# Patient Record
Sex: FEMALE | Race: WHITE | NOT HISPANIC OR LATINO | Employment: FULL TIME | URBAN - METROPOLITAN AREA
[De-identification: names, ages, dates, MRNs, and addresses within clinical notes are randomized per-mention and may not be internally consistent; named-entity substitution may affect disease eponyms.]

---

## 2017-11-27 ENCOUNTER — OFFICE VISIT (OUTPATIENT)
Dept: OBSTETRICS AND GYNECOLOGY | Age: 47
End: 2017-11-27

## 2017-11-27 VITALS — WEIGHT: 151 LBS | BODY MASS INDEX: 25.78 KG/M2 | HEIGHT: 64 IN

## 2017-11-27 DIAGNOSIS — Z01.419 ENCOUNTER FOR GYNECOLOGICAL EXAMINATION WITHOUT ABNORMAL FINDING: Primary | ICD-10-CM

## 2017-11-27 DIAGNOSIS — F41.0 PANIC ATTACKS: ICD-10-CM

## 2017-11-27 DIAGNOSIS — F41.9 ANXIETY: ICD-10-CM

## 2017-11-27 DIAGNOSIS — Z12.4 SCREENING FOR CERVICAL CANCER: ICD-10-CM

## 2017-11-27 PROCEDURE — 99396 PREV VISIT EST AGE 40-64: CPT | Performed by: OBSTETRICS & GYNECOLOGY

## 2017-11-27 NOTE — PROGRESS NOTES
"Routine Annual Visit    2017    Patient: Tasneem Nicholas          MR#:[id number]      Chief Complaint   Patient presents with   • Annual Exam     PT HERE FOR ROUTINE AE AND MG. SHE IS DOING OK. DECLINED BP TODAY DUE TO \"THROWING HER INTO A PANIC ATTACK\". DEALING WITH ANXIETY. LAST PAP  (NEG AND NEG HPV).       History of Present Illness    47 y.o. female  who presents for annual exam.   Having a lot of stress with 17 yo adopted son- homeschooling him  Also he is bipolar  Menses regular but not closer together, no skips, not too heavy  No HF or NS  Gave pt menopausal HO  Noticing anxiety a lot worse and seeing psychiatrist soon  Pap and mammo today          Patient's last menstrual period was 2017 (exact date).  Obstetric History:  OB History      Para Term  AB Living    0 0 0 0 0 0    SAB TAB Ectopic Multiple Live Births    0 0 0 0 0         Menstrual History:     Patient's last menstrual period was 2017 (exact date).       Sexual History:       ________________________________________  There is no problem list on file for this patient.      Past Medical History:   Diagnosis Date   • Anxiety        No past surgical history on file.    History   Smoking Status   • Never Smoker   Smokeless Tobacco   • Not on file       has a current medication list which includes the following prescription(s): clonazepam.  ________________________________________    Current contraception: none  History of abnormal Pap smear: no  Family history of Breast cancer: no  Family history of uterine or ovarian cancer: no  Family History of colon cancer/colon polyps: no  History of abnormal mammogram: no      The following portions of the patient's history were reviewed and updated as appropriate: allergies, current medications, past family history, past medical history, past social history, past surgical history and problem list.    Review of Systems    Pertinent items are noted in HPI.     Objective " "  Physical Exam    Ht 64\" (162.6 cm)  Wt 151 lb (68.5 kg)  LMP 11/03/2017 (Exact Date)  BMI 25.92 kg/m2   BP Readings from Last 3 Encounters:   07/23/16 133/92      Wt Readings from Last 3 Encounters:   11/27/17 151 lb (68.5 kg)   07/23/16 145 lb (65.8 kg)      BMI: Estimated body mass index is 25.92 kg/(m^2) as calculated from the following:    Height as of this encounter: 64\" (162.6 cm).    Weight as of this encounter: 151 lb (68.5 kg).      General:   alert, appears stated age and cooperative   Abdomen: soft, non-tender, without masses or organomegaly   Breast: inspection negative, no nipple discharge or bleeding, no masses or nodularity palpable   Vulva: normal   Vagina: normal mucosa   Cervix: no cervical motion tenderness and no lesions   Uterus: normal size, mobile or non-tender   Adnexa: no mass, fullness, tenderness     Assessment:    1. Normal annual exam   Assessment     ICD-10-CM ICD-9-CM   1. Encounter for gynecological examination without abnormal finding Z01.419 V72.31   2. Anxiety F41.9 300.00   3. Panic attacks F41.0 300.01   4. Screening for cervical cancer Z12.4 V76.2     Plan:    Plan     [x]  Mammogram request made  [x]  PAP done  []  Labs:   []  GC/Chl/TV  []  DEXA scan   []  Referral for colonoscopy:       Tasneem was seen today for annual exam.    Diagnoses and all orders for this visit:    Encounter for gynecological examination without abnormal finding  -     IGP, Aptima HPV, Rfx 16 / 18,45 - ThinPrep Vial, Cervix    Anxiety  -     IGP, Aptima HPV, Rfx 16 / 18,45 - ThinPrep Vial, Cervix    Panic attacks  -     IGP, Aptima HPV, Rfx 16 / 18,45 - ThinPrep Vial, Cervix    Screening for cervical cancer  -     IGP, Aptima HPV, Rfx 16 / 18,45 - ThinPrep Vial, Cervix            Counseling:  --Nutrition: Stressed importance of moderation and caloric balance, stressed fresh fruit and vegetables  --Exercise: Stressed the importance of regular exercise. 3-5 times weekly   - Discussed screening " mammogram recommendations.   --Discussed benefits of screening colonoscopy- age 50 unless FH  --Discussed pap smear screening recommendations

## 2017-11-30 LAB
CYTOLOGIST CVX/VAG CYTO: NORMAL
CYTOLOGY CVX/VAG DOC THIN PREP: NORMAL
DX ICD CODE: NORMAL
HIV 1 & 2 AB SER-IMP: NORMAL
HPV I/H RISK 4 DNA CVX QL PROBE+SIG AMP: NEGATIVE
Lab: NORMAL
OTHER STN SPEC: NORMAL
PATH REPORT.FINAL DX SPEC: NORMAL
STAT OF ADQ CVX/VAG CYTO-IMP: NORMAL

## 2018-12-31 ENCOUNTER — OFFICE VISIT (OUTPATIENT)
Dept: URGENT CARE | Facility: CLINIC | Age: 48
End: 2018-12-31
Payer: COMMERCIAL

## 2018-12-31 VITALS
SYSTOLIC BLOOD PRESSURE: 157 MMHG | OXYGEN SATURATION: 98 % | DIASTOLIC BLOOD PRESSURE: 97 MMHG | WEIGHT: 145 LBS | TEMPERATURE: 98 F | HEART RATE: 101 BPM | RESPIRATION RATE: 18 BRPM | HEIGHT: 65 IN | BODY MASS INDEX: 24.16 KG/M2

## 2018-12-31 DIAGNOSIS — R30.0 DYSURIA: ICD-10-CM

## 2018-12-31 DIAGNOSIS — J02.9 SORE THROAT: Primary | ICD-10-CM

## 2018-12-31 DIAGNOSIS — J01.90 ACUTE NON-RECURRENT SINUSITIS, UNSPECIFIED LOCATION: ICD-10-CM

## 2018-12-31 DIAGNOSIS — J02.0 STREP PHARYNGITIS: ICD-10-CM

## 2018-12-31 LAB
BILIRUB UR QL STRIP: NEGATIVE
CTP QC/QA: YES
GLUCOSE UR QL STRIP: NEGATIVE
KETONES UR QL STRIP: NEGATIVE
LEUKOCYTE ESTERASE UR QL STRIP: NEGATIVE
PH, POC UA: 6 (ref 5–8)
POC BLOOD, URINE: NEGATIVE
POC NITRATES, URINE: NEGATIVE
PROT UR QL STRIP: NEGATIVE
S PYO RRNA THROAT QL PROBE: NEGATIVE
SP GR UR STRIP: 1.01 (ref 1–1.03)
UROBILINOGEN UR STRIP-ACNC: NEGATIVE (ref 0.1–1.1)

## 2018-12-31 PROCEDURE — 87880 STREP A ASSAY W/OPTIC: CPT | Mod: QW,S$GLB,, | Performed by: NURSE PRACTITIONER

## 2018-12-31 PROCEDURE — 81003 URINALYSIS AUTO W/O SCOPE: CPT | Mod: QW,S$GLB,, | Performed by: NURSE PRACTITIONER

## 2018-12-31 PROCEDURE — 99204 OFFICE O/P NEW MOD 45 MIN: CPT | Mod: 25,S$GLB,, | Performed by: NURSE PRACTITIONER

## 2018-12-31 RX ORDER — AZITHROMYCIN 250 MG/1
TABLET, FILM COATED ORAL
Refills: 0 | COMMUNITY
Start: 2018-12-05

## 2018-12-31 RX ORDER — PREDNISONE 20 MG/1
TABLET ORAL
Refills: 0 | COMMUNITY
Start: 2018-12-05

## 2018-12-31 RX ORDER — CLONAZEPAM 2 MG/1
2 TABLET ORAL
COMMUNITY

## 2018-12-31 RX ORDER — ESCITALOPRAM OXALATE 10 MG/1
TABLET ORAL
Refills: 3 | COMMUNITY
Start: 2018-11-02

## 2018-12-31 RX ORDER — FLUTICASONE PROPIONATE 50 MCG
2 SPRAY, SUSPENSION (ML) NASAL 2 TIMES DAILY
Qty: 1 BOTTLE | Refills: 0 | Status: SHIPPED | OUTPATIENT
Start: 2018-12-31

## 2018-12-31 RX ORDER — CLINDAMYCIN HYDROCHLORIDE 300 MG/1
300 CAPSULE ORAL EVERY 8 HOURS
Qty: 30 CAPSULE | Refills: 0 | Status: SHIPPED | OUTPATIENT
Start: 2018-12-31 | End: 2019-01-10

## 2018-12-31 RX ORDER — PHENAZOPYRIDINE HYDROCHLORIDE 100 MG/1
100 TABLET, FILM COATED ORAL 3 TIMES DAILY PRN
Qty: 6 TABLET | Refills: 0 | Status: SHIPPED | OUTPATIENT
Start: 2018-12-31

## 2018-12-31 RX ORDER — LORATADINE 10 MG/1
10 TABLET ORAL DAILY
Qty: 30 TABLET | Refills: 2 | Status: SHIPPED | OUTPATIENT
Start: 2018-12-31 | End: 2019-12-31

## 2018-12-31 RX ORDER — PROMETHAZINE HYDROCHLORIDE AND DEXTROMETHORPHAN HYDROBROMIDE 6.25; 15 MG/5ML; MG/5ML
5 SYRUP ORAL
Qty: 118 ML | Refills: 0 | Status: SHIPPED | OUTPATIENT
Start: 2018-12-31 | End: 2019-01-10

## 2018-12-31 RX ORDER — METHYLPREDNISOLONE 4 MG/1
4 TABLET ORAL DAILY
Qty: 21 TABLET | Refills: 0 | Status: SHIPPED | OUTPATIENT
Start: 2018-12-31 | End: 2019-01-21

## 2018-12-31 RX ORDER — BENZONATATE 200 MG/1
200 CAPSULE ORAL 3 TIMES DAILY PRN
Qty: 20 CAPSULE | Refills: 0 | Status: SHIPPED | OUTPATIENT
Start: 2018-12-31 | End: 2019-01-10

## 2018-12-31 NOTE — PATIENT INSTRUCTIONS
"  Dysuria     Painful urination (dysuria) is often caused by a problem in the urinary tract.   Dysuria is pain felt during urination. It is often described as a burning. Learn more about this problem and how it can be treated.  What causes dysuria?  Possible causes include:  · Infection with a bacteria or virus such as a urinary tract infection (UTI or a sexually transmitted infection (STI)  · Sensitivity or allergy to chemicals such as those found in lotions and other products  · Prostate or bladder problems  · Radiation therapy to the pelvic area  How is dysuria diagnosed?  Your healthcare provider will examine you. He or she will ask about your symptoms and health. After talking with you and doing a physical exam, your healthcare provider may know what is causing your dysuria. He or she will usually request  a sample of your urine. Tests of your urine, or a "urinalysis," are done. A urinalysis may include:  · Looking at the urine sample (visual exam)  · Checking for substances (chemical exam)  · Looking at a small amount under a microscope (microscopic exam)  Some parts of the urinalysis may be done in the provider's office and some in a lab. And, the urine sample may be checked for bacteria and yeast (urine culture). Your healthcare provider will tell you more about these tests if they are needed.  How is dysuria treated?  Treatment depends on the cause. If you have a bacterial infection, you may need antibiotics. You may be given medicines to make it easier for you to urinate and help relieve pain. Your healthcare provider can tell you more about your treatment options. Untreated, symptoms may get worse.  When to call your healthcare provider  Call the healthcare provider right away if you have any of the following:  · Fever of 100.4°F (38°C) or higher   · No improvement after three days of treatment  · Trouble urinating because of pain  · New or increased discharge from the vagina or penis  · Rash or joint " pain  · Increased back or abdominal pain  · Enlarged painful lymph nodes (lumps) in the groin   Date Last Reviewed: 1/1/2017  © 5631-7446 [employer]. [address] [place] [address], [place] [address]. All rights reserved. This information is not intended as a substitute for professional medical care. Always follow your healthcare professional's instructions.

## 2018-12-31 NOTE — PROGRESS NOTES
"Subjective:       Patient ID: Jamee Varghese is a 48 y.o. female.    Vitals:  height is 5' 5" (1.651 m) and weight is 65.8 kg (145 lb). Her temperature is 97.5 °F (36.4 °C). Her blood pressure is 157/97 (abnormal) and her pulse is 101. Her respiration is 18 and oxygen saturation is 98%.     Chief Complaint: Sore Throat and Urinary Frequency    Pt from [place], here on vacation, drove here, 2 weeks of bronchitis with steroids and abx's-got much better but last few days began having a sore throat after arriving here-hurts more on right side, also reports uti symptoms began this morning with urinary urgency/presssure. Sister had strep and she was exposed. Cough is still present but much better now. Pt feels fatigue tired and run down. Pt using nasocort and zyrtec.      Sore Throat    This is a new problem. The current episode started in the past 7 days. The problem has been gradually worsening. There has been no fever. Associated symptoms include coughing, a hoarse voice and swollen glands. Pertinent negatives include no congestion, ear pain, shortness of breath, stridor, trouble swallowing or vomiting. She has had exposure to strep.   Urinary Frequency    Associated symptoms include frequency and urgency. Pertinent negatives include no chills, hematuria, nausea, vomiting or rash.       Constitution: Positive for fatigue. Negative for appetite change, chills, sweating and fever.   HENT: Positive for sore throat. Negative for ear pain, congestion, sinus pain, sinus pressure, trouble swallowing and voice change.    Neck: Positive for painful lymph nodes.   Eyes: Negative for eye redness.   Respiratory: Positive for cough. Negative for chest tightness, sputum production, bloody sputum, COPD, shortness of breath, stridor, wheezing and asthma.    Gastrointestinal: Negative for nausea and vomiting.   Genitourinary: Positive for frequency, urgency and pelvic pain. Negative for hematuria.   Musculoskeletal: Negative for " muscle ache.   Skin: Negative for rash.   Allergic/Immunologic: Negative for seasonal allergies and asthma.   Hematologic/Lymphatic: Positive for swollen lymph nodes.       Objective:      Physical Exam   Constitutional: She is oriented to person, place, and time. She appears well-developed and well-nourished. She is cooperative.  Non-toxic appearance. She does not appear ill. No distress.   HENT:   Head: Normocephalic and atraumatic.   Right Ear: Hearing, tympanic membrane, external ear and ear canal normal.   Left Ear: Hearing, tympanic membrane, external ear and ear canal normal.   Nose: Mucosal edema and rhinorrhea present. No nasal deformity. No epistaxis. Right sinus exhibits no maxillary sinus tenderness and no frontal sinus tenderness. Left sinus exhibits no maxillary sinus tenderness and no frontal sinus tenderness.   Mouth/Throat: Uvula is midline and mucous membranes are normal. No trismus in the jaw. Normal dentition. No uvula swelling. Posterior oropharyngeal edema and posterior oropharyngeal erythema present. Tonsils are 1+ on the right. Tonsils are 1+ on the left.   Eyes: Conjunctivae and lids are normal. No scleral icterus.   Sclera clear bilat   Neck: Trachea normal, normal range of motion, full passive range of motion without pain and phonation normal. Neck supple.   Cardiovascular: Normal rate, regular rhythm, normal heart sounds, intact distal pulses and normal pulses.   Pulmonary/Chest: Effort normal and breath sounds normal. No respiratory distress.   Abdominal: Soft. Normal appearance and bowel sounds are normal. She exhibits no distension, no abdominal bruit, no pulsatile midline mass and no mass. There is no tenderness. There is no CVA tenderness.   Musculoskeletal: Normal range of motion. She exhibits no edema or deformity.   Lymphadenopathy:        Head (right side): Submandibular adenopathy present.        Head (left side): Submandibular adenopathy present.   Neurological: She is alert  and oriented to person, place, and time. She has normal strength. She exhibits normal muscle tone. Coordination normal.   Skin: Skin is warm, dry and intact. She is not diaphoretic. No pallor.   Psychiatric: She has a normal mood and affect. Her speech is normal and behavior is normal. Judgment and thought content normal. Cognition and memory are normal.   Nursing note and vitals reviewed.      Assessment:       1. Sore throat    2. Dysuria    3. Acute non-recurrent sinusitis, unspecified location    4. Strep pharyngitis        Plan:         Sore throat  -     POCT rapid strep A    Dysuria  -     POCT Urinalysis, Dipstick, Automated, W/O Scope    Acute non-recurrent sinusitis, unspecified location  -     loratadine (CLARITIN) 10 mg tablet; Take 1 tablet (10 mg total) by mouth once daily.  Dispense: 30 tablet; Refill: 2  -     fluticasone (FLONASE) 50 mcg/actuation nasal spray; 2 sprays (100 mcg total) by Each Nare route 2 (two) times daily.  Dispense: 1 Bottle; Refill: 0    Strep pharyngitis    Other orders  -     phenazopyridine (PYRIDIUM) 100 MG tablet; Take 1 tablet (100 mg total) by mouth 3 (three) times daily as needed for Pain.  Dispense: 6 tablet; Refill: 0  -     clindamycin (CLEOCIN) 300 MG capsule; Take 1 capsule (300 mg total) by mouth every 8 (eight) hours. for 10 days  Dispense: 30 capsule; Refill: 0  -     promethazine-dextromethorphan (PROMETHAZINE-DM) 6.25-15 mg/5 mL Syrp; Take 5 mLs by mouth every 4 to 6 hours as needed.  Dispense: 118 mL; Refill: 0  -     benzonatate (TESSALON) 200 MG capsule; Take 1 capsule (200 mg total) by mouth 3 (three) times daily as needed for Cough.  Dispense: 20 capsule; Refill: 0  -     methylPREDNISolone (MEDROL) 4 MG Tab; Take 1 tablet (4 mg total) by mouth once daily. 24 mg (6 pills) PO on day 1, then decrease by 4mg/day x 5 days per dose pack instructions. for 21 doses  Dispense: 21 tablet; Refill: 0

## 2019-01-09 ENCOUNTER — PROCEDURE VISIT (OUTPATIENT)
Dept: OBSTETRICS AND GYNECOLOGY | Age: 49
End: 2019-01-09

## 2019-01-09 ENCOUNTER — OFFICE VISIT (OUTPATIENT)
Dept: OBSTETRICS AND GYNECOLOGY | Age: 49
End: 2019-01-09

## 2019-01-09 ENCOUNTER — APPOINTMENT (OUTPATIENT)
Dept: WOMENS IMAGING | Facility: HOSPITAL | Age: 49
End: 2019-01-09

## 2019-01-09 VITALS
DIASTOLIC BLOOD PRESSURE: 70 MMHG | HEIGHT: 65 IN | BODY MASS INDEX: 26.16 KG/M2 | SYSTOLIC BLOOD PRESSURE: 128 MMHG | WEIGHT: 157 LBS

## 2019-01-09 DIAGNOSIS — Z12.31 VISIT FOR SCREENING MAMMOGRAM: Primary | ICD-10-CM

## 2019-01-09 DIAGNOSIS — Z01.419 WELL WOMAN EXAM WITH ROUTINE GYNECOLOGICAL EXAM: Primary | ICD-10-CM

## 2019-01-09 DIAGNOSIS — N92.6 IRREGULAR MENSES: ICD-10-CM

## 2019-01-09 PROCEDURE — 99396 PREV VISIT EST AGE 40-64: CPT | Performed by: PHYSICIAN ASSISTANT

## 2019-01-09 PROCEDURE — 77067 SCR MAMMO BI INCL CAD: CPT | Performed by: OBSTETRICS & GYNECOLOGY

## 2019-01-09 PROCEDURE — 77067 SCR MAMMO BI INCL CAD: CPT | Performed by: RADIOLOGY

## 2019-01-09 PROCEDURE — 82274 ASSAY TEST FOR BLOOD FECAL: CPT | Performed by: PHYSICIAN ASSISTANT

## 2019-01-09 RX ORDER — ESCITALOPRAM OXALATE 10 MG/1
TABLET ORAL
Refills: 3 | COMMUNITY
Start: 2019-01-06 | End: 2019-09-08 | Stop reason: ALTCHOICE

## 2019-01-09 NOTE — PROGRESS NOTES
"Subjective     Chief Complaint   Patient presents with   • Gynecologic Exam     annual exam. last pap 2017 neg/neg last m/g today       History of Present Illness    Tasneem Nicholas is a 48 y.o.  who presents for annual exam.    She is here for annual  She was told that she is \"menopausal\"  She was seen at her PCP and they ran some labs on her that indicated this.    Was seen by Kamla Amato NP, IMM (integrative medicine)  She \"peed on a bunch of sticks\" and then reviewed her results with her  She was offered meds but didn't take them  She was noting mood changes that have improved with lexapro  She is not really interested in looking otherwise    She is recovering from bronchitis  Had it for 5 wks  Wants to get the flu shot but hasn't felt well enough to get it.    She is a pt of Dr oneill    Her menses are regular every 28-30 days, lasting 4-7 days, dysmenorrhea none   Obstetric History:  OB History      Para Term  AB Living    0 0 0 0 0 0    SAB TAB Ectopic Molar Multiple Live Births    0 0 0 0 0 0         Menstrual History:     No LMP recorded.         Current contraception: vasectomy  History of abnormal Pap smear: no  Received Gardasil immunization: no  Perform regular self breast exam: no  Family history of uterine or ovarian cancer: no  Family History of colon cancer: no  Family history of breast cancer: no    Mammogram: done today.  Colonoscopy: recommended.  DEXA: not indicated.    Exercise: moderately active  Calcium/Vitamin D: adequate intake    The following portions of the patient's history were reviewed and updated as appropriate: allergies, current medications, past family history, past medical history, past social history, past surgical history and problem list.    Review of Systems   Genitourinary: Positive for menstrual problem (shorter menstrual cycle).   All other systems reviewed and are negative.      Review of Systems   Constitutional: Negative for fatigue. " "  Respiratory: Negative for shortness of breath.    Gastrointestinal: Negative for abdominal pain.   Genitourinary: Negative for dysuria.   Neurological: Negative for headaches.   Psychiatric/Behavioral: Negative for dysphoric mood.         Objective   Physical Exam    /70   Ht 163.8 cm (64.5\")   Wt 71.2 kg (157 lb)   Breastfeeding? No   BMI 26.53 kg/m²     General:   alert, appears stated age and cooperative   Neck: no adenopathy and thyroid normal to palpation   Heart: regular rate and rhythm   Lungs: clear to auscultation bilaterally   Abdomen: soft and nontender   Breast: inspection negative, no nipple discharge or bleeding, no masses or nodularity palpable   Vulva: normal   Vagina: normal mucosa, normal discharge   Cervix: no lesions   Uterus: normal size, non-tender   Adnexa: normal adnexa and no mass, fullness, tenderness   Rectal: normal rectal, no masses and guaiac negative stool obtained     Assessment/Plan   Tasneem was seen today for gynecologic exam.    Diagnoses and all orders for this visit:    Well woman exam with routine gynecological exam    Irregular menses        All questions answered.  Breast self exam technique reviewed and patient encouraged to perform self-exam monthly.  Discussed healthy lifestyle modifications.  Recommended 30 minutes of aerobic exercise five times per week.  Discussed calcium needs to prevent osteoporosis.      Disc pap guidelines she is utd  Mammogram done today  Disc u/s to further eval irregular bleeding  Enc [facility], she will arrange           "

## 2019-02-27 ENCOUNTER — TELEPHONE (OUTPATIENT)
Dept: OBSTETRICS AND GYNECOLOGY | Age: 49
End: 2019-02-27

## 2019-03-05 ENCOUNTER — PROCEDURE VISIT (OUTPATIENT)
Dept: OBSTETRICS AND GYNECOLOGY | Age: 49
End: 2019-03-05

## 2019-03-05 ENCOUNTER — OFFICE VISIT (OUTPATIENT)
Dept: OBSTETRICS AND GYNECOLOGY | Age: 49
End: 2019-03-05

## 2019-03-05 VITALS
BODY MASS INDEX: 25.99 KG/M2 | HEIGHT: 65 IN | DIASTOLIC BLOOD PRESSURE: 90 MMHG | SYSTOLIC BLOOD PRESSURE: 150 MMHG | WEIGHT: 156 LBS

## 2019-03-05 DIAGNOSIS — D25.1 INTRAMURAL LEIOMYOMA OF UTERUS: ICD-10-CM

## 2019-03-05 DIAGNOSIS — R10.2 PELVIC PAIN: ICD-10-CM

## 2019-03-05 DIAGNOSIS — N92.6 IRREGULAR MENSES: Primary | ICD-10-CM

## 2019-03-05 DIAGNOSIS — R10.2 PELVIC PAIN IN FEMALE: Primary | ICD-10-CM

## 2019-03-05 PROCEDURE — 99213 OFFICE O/P EST LOW 20 MIN: CPT | Performed by: OBSTETRICS & GYNECOLOGY

## 2019-03-05 PROCEDURE — 76830 TRANSVAGINAL US NON-OB: CPT | Performed by: OBSTETRICS & GYNECOLOGY

## 2019-03-05 NOTE — PROGRESS NOTES
"GYN Visit    3/5/2019    Patient: Tasneem Nicholas          MR#:[id number]      Chief Complaint   Patient presents with   • Imaging Only     PT HERE FOR GYN U/S DUE TO PELVIC PAIN AND OVARIAN CYST. SHE IS WELL.       History of Present Illness    49 y.o. female  who presents for  Pelvic pain  Usually has ovulation pain and dysmenorrhea every month  Cycles now every 3 weeks  No HF or NS  No pregnancy   has vas  Adopted    This past cycle had the ovulation pain and more than usual and lasted 4-5 days  Today doing well, on day 2 menses, no pain today        Patient's last menstrual period was 2019.    ________________________________________  There is no problem list on file for this patient.      Past Medical History:   Diagnosis Date   • Anxiety        No past surgical history on file.    Social History     Tobacco Use   Smoking Status Never Smoker       has a current medication list which includes the following prescription(s): clonazepam, escitalopram, brompheniramine-pseudoephedrine-dm, and clindamycin.  ________________________________________    Current contraception: vasectomy      The following portions of the patient's history were reviewed and updated as appropriate: allergies, current medications, past family history, past medical history, past social history, past surgical history and problem list.    Review of Systems   Genitourinary: Positive for pelvic pain. Negative for dyspareunia and vaginal discharge.   Psychiatric/Behavioral: Negative for dysphoric mood.   All other systems reviewed and are negative.      Pertinent items are noted in HPI.     Objective   Physical Exam    /90   Ht 163.8 cm (64.5\")   Wt 70.8 kg (156 lb)   LMP 2019   BMI 26.36 kg/m²    BP Readings from Last 3 Encounters:   19 150/90   19 124/83   19 128/70      Wt Readings from Last 3 Encounters:   19 70.8 kg (156 lb)   19 68 kg (150 lb)   19 71.2 kg (157 lb)    " "  BMI: Estimated body mass index is 26.36 kg/m² as calculated from the following:    Height as of this encounter: 163.8 cm (64.5\").    Weight as of this encounter: 70.8 kg (156 lb).    Lungs: non labored breathing, no wheezing or tachpnea  Extremities: extremities normal, atraumatic, no cyanosis or edema  Skin: Skin color, texture, turgor normal. No rashes or lesions  Neurologic: Grossly normal  General:   alert, appears stated age and cooperative   Abdomen: soft, non-tender, without masses or organomegaly       Vulva: normal   Vagina: normal mucosa   Cervix: no cervical motion tenderness and no lesions   Uterus: normal size, mobile or non-tender   Adnexa: no mass, fullness, tenderness     Assessment:      Tasneem was seen today for imaging only.    Diagnoses and all orders for this visit:    Pelvic pain in female    Intramural leiomyoma of uterus      See US- small fibroid, lining thin 5 mm, ovaries are normal, no ovarian cyst  Cul de sac fluid non-specific, could be evidence of ruptured cyst resolving  Reassuring US    Expectant management, discussed possible future menopausal symptoms         "

## 2020-01-21 ENCOUNTER — TELEPHONE (OUTPATIENT)
Dept: OBSTETRICS AND GYNECOLOGY | Age: 50
End: 2020-01-21

## 2020-02-10 ENCOUNTER — OFFICE VISIT (OUTPATIENT)
Dept: OBSTETRICS AND GYNECOLOGY | Age: 50
End: 2020-02-10

## 2020-02-10 VITALS
DIASTOLIC BLOOD PRESSURE: 84 MMHG | HEIGHT: 65 IN | SYSTOLIC BLOOD PRESSURE: 140 MMHG | BODY MASS INDEX: 26.33 KG/M2 | WEIGHT: 158 LBS

## 2020-02-10 DIAGNOSIS — N94.6 DYSMENORRHEA: ICD-10-CM

## 2020-02-10 DIAGNOSIS — Z11.51 SCREENING FOR HPV (HUMAN PAPILLOMAVIRUS): ICD-10-CM

## 2020-02-10 DIAGNOSIS — R10.2 PELVIC PAIN: Primary | ICD-10-CM

## 2020-02-10 DIAGNOSIS — Z12.4 SCREENING FOR CERVICAL CANCER: ICD-10-CM

## 2020-02-10 PROCEDURE — 99213 OFFICE O/P EST LOW 20 MIN: CPT | Performed by: OBSTETRICS & GYNECOLOGY

## 2020-02-10 NOTE — PROGRESS NOTES
"GYN Visit    2/10/2020    Patient: Tasneem Nicholas          MR#:[id number]      Chief Complaint   Patient presents with   • Pelvic Pain     U/S with Tad. Pelvic pain. Cycles every three weeks.  Previously had U/S in 2019.       History of Present Illness    49 y.o. female  who presents for pelvic pain  Consistent from mid cycle to menses, gets a week break right after menses  Low level , daily and crampy  occ advil helps  Worked up for this in 2019 with normal US  No pain today  Bleeding with menses every 3 weeks, no HF or NS but has tested menopausal in past  Due for pap  Scheduled for US today but declined since she has no pain today         Patient's last menstrual period was 2020.    ________________________________________  Patient Active Problem List   Diagnosis   • UTI (urinary tract infection), uncomplicated       Past Medical History:   Diagnosis Date   • Anxiety        No past surgical history on file.    Social History     Tobacco Use   Smoking Status Never Smoker   Smokeless Tobacco Never Used       has a current medication list which includes the following prescription(s): clonazepam and doxycycline.  ________________________________________    Current contraception: none      The following portions of the patient's history were reviewed and updated as appropriate: allergies, current medications, past family history, past medical history, past social history, past surgical history and problem list.    Review of Systems   Constitutional: Negative for fatigue.   Gastrointestinal: Negative for abdominal distention.   Genitourinary: Positive for pelvic pain. Negative for dysuria and vaginal discharge.   Psychiatric/Behavioral: Negative for dysphoric mood.   All other systems reviewed and are negative.      Pertinent items are noted in HPI.     Objective   Physical Exam    /84   Ht 163.8 cm (64.5\")   Wt 71.7 kg (158 lb)   LMP 2020   Breastfeeding No   BMI 26.70 kg/m²  " "  BP Readings from Last 3 Encounters:   02/10/20 140/84   11/12/19 121/74   09/08/19 126/89      Wt Readings from Last 3 Encounters:   02/10/20 71.7 kg (158 lb)   11/12/19 68 kg (150 lb)   03/05/19 70.8 kg (156 lb)      BMI: Estimated body mass index is 26.7 kg/m² as calculated from the following:    Height as of this encounter: 163.8 cm (64.5\").    Weight as of this encounter: 71.7 kg (158 lb).    Lungs: non labored breathing, no wheezing or tachpnea  Extremities: extremities normal, atraumatic, no cyanosis or edema  Skin: Skin color, texture, turgor normal. No rashes or lesions  Neurologic: Grossly normal  General:   alert, appears stated age and cooperative   Abdomen: soft, non-tender, without masses or organomegaly       Vulva: normal   Vagina: normal mucosa   Cervix: no cervical motion tenderness and no lesions   Uterus: normal size, mobile or non-tender   Adnexa: no mass, fullness, tenderness     Assessment:      Tasneem was seen today for pelvic pain.    Diagnoses and all orders for this visit:    Pelvic pain    Dysmenorrhea    Screening for HPV (human papillomavirus)  -     IGP, Apt HPV,rfx 16 / 18,45    Screening for cervical cancer  -     IGP, Apt HPV,rfx 16 / 18,45      Pt declined US today  Pain clinically sounds like adenomyosis , endometriosis or dysmenorrhea,  Discussed low dose ocps, kyleena IUD, hysterectomy etc  Pt will think about it  Has AE and mammo scheduled in march        "

## 2020-02-12 LAB
CYTOLOGIST CVX/VAG CYTO: NORMAL
CYTOLOGY CVX/VAG DOC CYTO: NORMAL
CYTOLOGY CVX/VAG DOC THIN PREP: NORMAL
DX ICD CODE: NORMAL
HIV 1 & 2 AB SER-IMP: NORMAL
HPV I/H RISK 4 DNA CVX QL PROBE+SIG AMP: NEGATIVE
OTHER STN SPEC: NORMAL
STAT OF ADQ CVX/VAG CYTO-IMP: NORMAL

## 2020-04-28 ENCOUNTER — PROCEDURE VISIT (OUTPATIENT)
Dept: OBSTETRICS AND GYNECOLOGY | Age: 50
End: 2020-04-28

## 2020-04-28 ENCOUNTER — APPOINTMENT (OUTPATIENT)
Dept: WOMENS IMAGING | Facility: HOSPITAL | Age: 50
End: 2020-04-28

## 2020-04-28 DIAGNOSIS — Z12.31 VISIT FOR SCREENING MAMMOGRAM: Primary | ICD-10-CM

## 2020-04-28 PROCEDURE — 77067 SCR MAMMO BI INCL CAD: CPT | Performed by: RADIOLOGY

## 2020-04-28 PROCEDURE — 77067 SCR MAMMO BI INCL CAD: CPT | Performed by: OBSTETRICS & GYNECOLOGY

## 2021-03-30 ENCOUNTER — BULK ORDERING (OUTPATIENT)
Dept: CASE MANAGEMENT | Facility: OTHER | Age: 51
End: 2021-03-30

## 2021-03-30 DIAGNOSIS — Z23 IMMUNIZATION DUE: ICD-10-CM

## 2021-11-17 ENCOUNTER — OFFICE VISIT (OUTPATIENT)
Dept: OBSTETRICS AND GYNECOLOGY | Age: 51
End: 2021-11-17

## 2021-11-17 VITALS
DIASTOLIC BLOOD PRESSURE: 84 MMHG | SYSTOLIC BLOOD PRESSURE: 128 MMHG | HEIGHT: 65 IN | BODY MASS INDEX: 27.32 KG/M2 | WEIGHT: 164 LBS

## 2021-11-17 DIAGNOSIS — N92.6 IRREGULAR MENSES: Primary | ICD-10-CM

## 2021-11-17 DIAGNOSIS — R35.0 URINARY FREQUENCY: ICD-10-CM

## 2021-11-17 LAB
BILIRUB BLD-MCNC: NEGATIVE MG/DL
CLARITY, POC: CLEAR
COLOR UR: YELLOW
GLUCOSE UR STRIP-MCNC: NEGATIVE MG/DL
KETONES UR QL: NEGATIVE
LEUKOCYTE EST, POC: NEGATIVE
NITRITE UR-MCNC: NEGATIVE MG/ML
PH UR: 6.5 [PH] (ref 5–8)
PROT UR STRIP-MCNC: NEGATIVE MG/DL
RBC # UR STRIP: NEGATIVE /UL
SP GR UR: 1.02 (ref 1–1.03)
UROBILINOGEN UR QL: NORMAL

## 2021-11-17 PROCEDURE — 81002 URINALYSIS NONAUTO W/O SCOPE: CPT | Performed by: PHYSICIAN ASSISTANT

## 2021-11-17 PROCEDURE — 99213 OFFICE O/P EST LOW 20 MIN: CPT | Performed by: PHYSICIAN ASSISTANT

## 2021-11-17 RX ORDER — LISINOPRIL 10 MG/1
10 TABLET ORAL DAILY
COMMUNITY

## 2021-11-17 RX ORDER — ESCITALOPRAM OXALATE 5 MG/1
5 TABLET ORAL DAILY
COMMUNITY

## 2021-11-17 RX ORDER — MEDROXYPROGESTERONE ACETATE 10 MG/1
10 TABLET ORAL DAILY
Qty: 10 TABLET | Refills: 3 | Status: SHIPPED | OUTPATIENT
Start: 2021-11-17 | End: 2022-04-25

## 2021-11-17 RX ORDER — CETIRIZINE HYDROCHLORIDE 10 MG/1
10 TABLET ORAL DAILY
COMMUNITY

## 2021-11-17 NOTE — PROGRESS NOTES
"Subjective     Chief Complaint   Patient presents with   • Gynecologic Exam     c/o had gone 5 months with no period, then started spotting, then has been changing a pad a day for two weeks.  has stopped. now.  (c/o cramping), also c/o urine frequency       Tasneem Nicholas is a 51 y.o.  whose LMP is No LMP recorded. Patient is perimenopausal. presents with irregular menses    She has a h/o this  Has had an u/s done in   Lining was wnl  No biopsy done  tx options were offered at the time but pt declined    Did have labs done at her pcp's office that said she was MP  This was 2 ya  Nothing on file    D/w pt that MP is generally diagnosed when pt has been 1 year without a period    She is also noting 2 episode of urinary incontinence       No Additional Complaints Reported    The following portions of the patient's history were reviewed and updated as appropriate:vital signs, allergies, current medications, past family history, past medical history, past social history, past surgical history and problem list      Review of Systems   Genitourinary:positive for abnormal menstrual periods     Objective      /84   Ht 163.8 cm (64.5\")   Wt 74.4 kg (164 lb)   Breastfeeding No   BMI 27.72 kg/m²     Physical Exam    General:   alert, comfortable and no distress   Heart: Not performed today   Lungs: Not performed today.   Breast: Not performed today   Neck: na   Abdomen: {Not performed today   CVA: Not performed today   Pelvis: External genitalia: normal general appearance  Vaginal: normal mucosa without prolapse or lesions  Cervix: normal appearance and stenotic appearing   Extremities: Not performed today   Neurologic: negative   Psychiatric: Normal affect, judgement, and mood     Endometrial Biopsy    Date of procedure:  2021    Procedure documentation:    The cervix was grasped anterior at the 12 o'clock position.  The cavity sounded to 5 centimeters.  An endometrial biopsy specimen was " unable to be obtained d/t cervical stenosis. Dilation was attempted multiple times but unable to pass beyond 5 cm. No biopsy was obtained    Pt tolerated procedure ok              Lab Review   Labs: Urinalysis - with micro     Imaging   Ultrasound - Pelvic Vaginal    2 anterior fibroids noted  Endo thickness measures 14 mm  B ovaries wnl, folllcle noted on right    Assessment/Plan     ASSESSMENT  1. Irregular menses    2. Urinary frequency        PLAN  1.   Orders Placed This Encounter   Procedures   • Urine Culture - Urine, Urine, Clean Catch   • Follicle Stimulating Hormone   • POC Urinalysis Dipstick       2. Medications prescribed this encounter:        New Medications Ordered This Visit   Medications   • medroxyPROGESTERone (Provera) 10 MG tablet     Sig: Take 1 tablet by mouth Daily.     Dispense:  10 tablet     Refill:  3       3. Urine cultrue to be sent    4. Disc lining. Will plan 3 months of provera use, 10 mg for 10 days. Plan rpt u/s in 3 months with Dr Bernal. Can discuss poc at that time.       Follow up: GILBERT Shannon  11/17/2021

## 2021-11-18 ENCOUNTER — TELEPHONE (OUTPATIENT)
Dept: OBSTETRICS AND GYNECOLOGY | Age: 51
End: 2021-11-18

## 2021-11-18 LAB — FSH SERPL-ACNC: 8.5 MIU/ML

## 2021-11-18 NOTE — TELEPHONE ENCOUNTER
----- Message from GILBERT Cain sent at 11/18/2021  9:01 AM EST -----  Let Tasneem know her levels are not elevated, she is still in a jonna menopausal range.  She can use the provera that I prescribed and keep her f/u appt in 3 months. Hope she is doing ok and let me know if she has any questions

## 2021-11-19 LAB
BACTERIA UR CULT: NORMAL
BACTERIA UR CULT: NORMAL

## 2021-12-27 ENCOUNTER — TELEPHONE (OUTPATIENT)
Dept: OBSTETRICS AND GYNECOLOGY | Age: 51
End: 2021-12-27

## 2021-12-27 NOTE — TELEPHONE ENCOUNTER
Her period can start within 2 weeks of finishing progesterone.  She may still start bleeding within a week.  If her period never happens that is ok,  continue the progesterone 10 days each month and keep US appt.    The progesterone may have side effect of mood swings.  If she wants to consider another option make appt with me, if she is ok to continue with plan keep appt in February.

## 2021-12-27 NOTE — TELEPHONE ENCOUNTER
PT calls stating she finished her 10 day script for this month 3 days ago and has not started her cycle yet, she is having light cramping. Pt wanting to ask if a side effect of the Provera is leg cramping. PT c/o cramping in both legs thigh to calf onset yesterday 5pm. She is also having intense mood swings which she did see was a side effect of the medication. Pt wanting to let you know she was diagnosed with MS over thanksgiving she is unsure if this is related. Please advise

## 2022-02-22 ENCOUNTER — TELEPHONE (OUTPATIENT)
Dept: OBSTETRICS AND GYNECOLOGY | Age: 52
End: 2022-02-22

## 2022-02-22 NOTE — TELEPHONE ENCOUNTER
Spoke with pt  Had period right after last US  FSH at that time was NOT menopausal  Took provera one month with no period  Next month no period without provera  No period in 3 months, no HF or NS  Diagnosed with MS 3 months ago right after her appt with us    Reviewed US done yesterday- lining is thin and uniform at 5 mm  Reassuring  Pt has mammo scheduled in April  Pap due 4/23  Recommend schedule AE 4/23 with mammo   Will call for any other bleeding concerns

## 2022-04-01 ENCOUNTER — APPOINTMENT (OUTPATIENT)
Dept: WOMENS IMAGING | Facility: HOSPITAL | Age: 52
End: 2022-04-01

## 2022-04-01 ENCOUNTER — PROCEDURE VISIT (OUTPATIENT)
Dept: OBSTETRICS AND GYNECOLOGY | Age: 52
End: 2022-04-01

## 2022-04-01 DIAGNOSIS — Z12.31 VISIT FOR SCREENING MAMMOGRAM: Primary | ICD-10-CM

## 2022-04-01 PROCEDURE — 77067 SCR MAMMO BI INCL CAD: CPT | Performed by: OBSTETRICS & GYNECOLOGY

## 2022-04-01 PROCEDURE — 77063 BREAST TOMOSYNTHESIS BI: CPT | Performed by: OBSTETRICS & GYNECOLOGY

## 2022-04-01 PROCEDURE — 77063 BREAST TOMOSYNTHESIS BI: CPT | Performed by: RADIOLOGY

## 2022-04-01 PROCEDURE — 77067 SCR MAMMO BI INCL CAD: CPT | Performed by: RADIOLOGY

## 2022-04-11 DIAGNOSIS — R92.8 ABNORMAL MAMMOGRAM: Primary | ICD-10-CM

## 2022-04-12 ENCOUNTER — TELEPHONE (OUTPATIENT)
Dept: OBSTETRICS AND GYNECOLOGY | Age: 52
End: 2022-04-12

## 2022-04-12 NOTE — TELEPHONE ENCOUNTER
Pt calls asking to review her mammogram results, please advise if you will call patient to review at your next earliest convenience

## 2022-04-15 ENCOUNTER — APPOINTMENT (OUTPATIENT)
Dept: WOMENS IMAGING | Facility: HOSPITAL | Age: 52
End: 2022-04-15

## 2022-04-15 PROCEDURE — G0279 TOMOSYNTHESIS, MAMMO: HCPCS | Performed by: RADIOLOGY

## 2022-04-15 PROCEDURE — 77065 DX MAMMO INCL CAD UNI: CPT | Performed by: RADIOLOGY

## 2022-04-15 PROCEDURE — 77061 BREAST TOMOSYNTHESIS UNI: CPT | Performed by: RADIOLOGY

## 2022-04-19 DIAGNOSIS — R92.8 ABNORMAL MAMMOGRAM: Primary | ICD-10-CM

## 2022-04-25 ENCOUNTER — OFFICE VISIT (OUTPATIENT)
Dept: OBSTETRICS AND GYNECOLOGY | Age: 52
End: 2022-04-25

## 2022-04-25 VITALS
BODY MASS INDEX: 26.33 KG/M2 | DIASTOLIC BLOOD PRESSURE: 78 MMHG | WEIGHT: 158 LBS | SYSTOLIC BLOOD PRESSURE: 126 MMHG | HEIGHT: 65 IN

## 2022-04-25 DIAGNOSIS — Z01.419 ENCOUNTER FOR GYNECOLOGICAL EXAMINATION WITHOUT ABNORMAL FINDING: Primary | ICD-10-CM

## 2022-04-25 PROCEDURE — 99396 PREV VISIT EST AGE 40-64: CPT | Performed by: OBSTETRICS & GYNECOLOGY

## 2022-04-25 RX ORDER — GLATIRAMER 40 MG/ML
40 INJECTION, SOLUTION SUBCUTANEOUS 3 TIMES WEEKLY
COMMUNITY
Start: 2022-01-05

## 2022-04-25 RX ORDER — MEDROXYPROGESTERONE ACETATE 150 MG/ML
150 INJECTION, SUSPENSION INTRAMUSCULAR
COMMUNITY
End: 2022-04-25

## 2022-04-25 NOTE — PROGRESS NOTES
Routine Annual Visit    2022    Patient: Tasneem Nicholas          MR#:[id number]      Chief Complaint   Patient presents with   • Gynecologic Exam     Last Pap 2/10/20 (-), Last mammo 20, Next Mammo 2022, No concerns at this time.       History of Present Illness    52 y.o. female  who presents for annual exam.   Difficult year  Lost mother, dementia  Diagnosed with MS-on meds and symptoms gone ( urinary incontinence one of them)  Pap UTD  UTD cologuard, mammo done and will have follow up in 6 mo  No bleeding          No LMP recorded (exact date). Patient is perimenopausal.  Obstetric History:  OB History        0    Para   0    Term   0       0    AB   0    Living   0       SAB   0    IAB   0    Ectopic   0    Molar   0    Multiple   0    Live Births   0               Menstrual History:     No LMP recorded (exact date). Patient is perimenopausal.       Sexual History:       ________________________________________  Patient Active Problem List   Diagnosis   • UTI (urinary tract infection), uncomplicated       Past Medical History:   Diagnosis Date   • Anxiety    • MS (multiple sclerosis) ([facility]) 2021       History reviewed. No pertinent surgical history.    Social History     Tobacco Use   Smoking Status Never Smoker   Smokeless Tobacco Never Used       has a current medication list which includes the following prescription(s): cetirizine, clonazepam, escitalopram, glatiramer acetate, lisinopril, and probiotic product.  ________________________________________    Current contraception: post menopausal status  History of abnormal Pap smear: no  Family history of Breast cancer: no  Family history of uterine or ovarian cancer: no  Family History of colon cancer/colon polyps: no  History of abnormal mammogram: no      The following portions of the patient's history were reviewed and updated as appropriate: allergies, current medications, past family history, past medical history, past  "social history, past surgical history and problem list.    Review of Systems    Pertinent items are noted in HPI.     Objective   Physical Exam    /78   Ht 163.8 cm (64.5\")   Wt 71.7 kg (158 lb)   LMP  (Exact Date)   Breastfeeding No   BMI 26.70 kg/m²    BP Readings from Last 3 Encounters:   04/25/22 126/78   02/21/22 128/84   11/17/21 128/84      Wt Readings from Last 3 Encounters:   04/25/22 71.7 kg (158 lb)   02/21/22 74 kg (163 lb 3.2 oz)   11/17/21 74.4 kg (164 lb)      BMI: Estimated body mass index is 26.7 kg/m² as calculated from the following:    Height as of this encounter: 163.8 cm (64.5\").    Weight as of this encounter: 71.7 kg (158 lb).      General:   alert, appears stated age and cooperative   Abdomen: soft, non-tender, without masses or organomegaly   Breast: inspection negative, no nipple discharge or bleeding, no masses or nodularity palpable   Vulva: normal   Vagina: normal mucosa   Cervix: no cervical motion tenderness and no lesions   Uterus: normal size, mobile and non-tender   Adnexa: no mass, fullness, tenderness     Assessment:    1. Normal annual exam   Assessment     ICD-10-CM ICD-9-CM   1. Encounter for gynecological examination without abnormal finding  Z01.419 V72.31     Plan:    Plan     []  Mammogram request made  []  PAP done  []  Labs:   []  GC/Chl/TV  []  DEXA scan   []  Referral for colonoscopy:       Diagnoses and all orders for this visit:    1. Encounter for gynecological examination without abnormal finding (Primary)            Counseling:  --Nutrition: Stressed importance of moderation and caloric balance, stressed fresh fruit and vegetables  --Exercise: Stressed the importance of regular exercise. 3-5 times weekly   - Discussed screening mammogram recommendations.   --Discussed benefits of screening colonoscopy- age 45 unless FH  --Discussed pap smear screening recommendations  "

## 2022-10-17 ENCOUNTER — APPOINTMENT (OUTPATIENT)
Dept: WOMENS IMAGING | Facility: HOSPITAL | Age: 52
End: 2022-10-17

## 2022-10-17 PROCEDURE — 77065 DX MAMMO INCL CAD UNI: CPT | Performed by: RADIOLOGY

## 2022-10-17 PROCEDURE — G0279 TOMOSYNTHESIS, MAMMO: HCPCS | Performed by: RADIOLOGY

## 2022-10-17 PROCEDURE — 77061 BREAST TOMOSYNTHESIS UNI: CPT | Performed by: RADIOLOGY

## 2022-10-20 DIAGNOSIS — R92.1 BREAST CALCIFICATIONS: Primary | ICD-10-CM

## 2023-04-28 ENCOUNTER — APPOINTMENT (OUTPATIENT)
Dept: WOMENS IMAGING | Facility: HOSPITAL | Age: 53
End: 2023-04-28
Payer: COMMERCIAL

## 2023-04-28 PROCEDURE — 77066 DX MAMMO INCL CAD BI: CPT | Performed by: RADIOLOGY

## 2023-04-28 PROCEDURE — G0279 TOMOSYNTHESIS, MAMMO: HCPCS | Performed by: RADIOLOGY

## 2023-04-28 PROCEDURE — 77062 BREAST TOMOSYNTHESIS BI: CPT | Performed by: RADIOLOGY

## 2023-05-15 ENCOUNTER — OFFICE VISIT (OUTPATIENT)
Dept: OBSTETRICS AND GYNECOLOGY | Age: 53
End: 2023-05-15
Payer: COMMERCIAL

## 2023-05-15 VITALS
WEIGHT: 161.2 LBS | BODY MASS INDEX: 26.86 KG/M2 | SYSTOLIC BLOOD PRESSURE: 118 MMHG | HEIGHT: 65 IN | DIASTOLIC BLOOD PRESSURE: 78 MMHG

## 2023-05-15 DIAGNOSIS — Z11.51 SCREENING FOR HPV (HUMAN PAPILLOMAVIRUS): ICD-10-CM

## 2023-05-15 DIAGNOSIS — Z12.4 SCREENING FOR CERVICAL CANCER: ICD-10-CM

## 2023-05-15 DIAGNOSIS — Z01.419 ENCOUNTER FOR GYNECOLOGICAL EXAMINATION WITHOUT ABNORMAL FINDING: Primary | ICD-10-CM

## 2023-05-15 NOTE — PROGRESS NOTES
Routine Annual Visit    5/15/2023    Patient: Tasneem Nicholas          MR#:[id number]      Chief Complaint   Patient presents with   • Gynecologic Exam     Annual Exam - last pap 2/10/20 neg, mammo 23, Cologaurd in , Pt has no complaints today       History of Present Illness    53 y.o. female  who presents for annual exam.   Pap due  mammo UTD  cologuard neg 2 years ago  Off MS meds and doing wholistic diet and acupuncture and better!    Sporadic menses, seldom with HF or NS  8 mo since last menses          No LMP recorded (lmp unknown). Patient is perimenopausal.  Obstetric History:  OB History        0    Para   0    Term   0       0    AB   0    Living   0       SAB   0    IAB   0    Ectopic   0    Molar   0    Multiple   0    Live Births   0               Menstrual History:     No LMP recorded (lmp unknown). Patient is perimenopausal.       Sexual History:       ________________________________________  Patient Active Problem List   Diagnosis   • UTI (urinary tract infection), uncomplicated       Past Medical History:   Diagnosis Date   • Anxiety    • Hypertension    • Kidney stone     1kidnet stone and passed   • MS (multiple sclerosis) 2021   • PMS (premenstrual syndrome) Since 12   • Urinary tract infection    • Varicella Child       Past Surgical History:   Procedure Laterality Date   • WISDOM TOOTH EXTRACTION         Social History     Tobacco Use   Smoking Status Never   Smokeless Tobacco Never       has a current medication list which includes the following prescription(s): cetirizine, clonazepam, escitalopram, lisinopril, and probiotic product.  ________________________________________    Current contraception: post menopausal status  History of abnormal Pap smear: no  Family history of Breast cancer: no  Family history of uterine or ovarian cancer: no  Family History of colon cancer/colon polyps: no  History of abnormal mammogram: yes - follow ups  "benign      The following portions of the patient's history were reviewed and updated as appropriate: allergies, current medications, past family history, past medical history, past social history, past surgical history and problem list.    Review of Systems    Pertinent items are noted in HPI.     Objective   Physical Exam    /78   Ht 163.8 cm (64.5\")   Wt 73.1 kg (161 lb 3.2 oz)   LMP  (LMP Unknown)   BMI 27.24 kg/m²    BP Readings from Last 3 Encounters:   05/15/23 118/78   04/25/22 126/78   02/21/22 128/84      Wt Readings from Last 3 Encounters:   05/15/23 73.1 kg (161 lb 3.2 oz)   04/25/22 71.7 kg (158 lb)   02/21/22 74 kg (163 lb 3.2 oz)      BMI: Estimated body mass index is 27.24 kg/m² as calculated from the following:    Height as of this encounter: 163.8 cm (64.5\").    Weight as of this encounter: 73.1 kg (161 lb 3.2 oz).      General:   alert, appears stated age and cooperative   Abdomen: soft, non-tender, without masses or organomegaly   Breast: inspection negative, no nipple discharge or bleeding, no masses or nodularity palpable   Vulva: normal   Vagina: normal mucosa   Cervix: no cervical motion tenderness and no lesions   Uterus: normal size, mobile and non-tender   Adnexa: no mass, fullness, tenderness     Assessment:    1. Normal annual exam   Assessment     ICD-10-CM ICD-9-CM   1. Encounter for gynecological examination without abnormal finding  Z01.419 V72.31   2. Screening for cervical cancer  Z12.4 V76.2   3. Screening for HPV (human papillomavirus)  Z11.51 V73.81     Plan:    Plan     []  Mammogram request made  []  PAP done  []  Labs:   []  GC/Chl/TV  []  DEXA scan   []  Referral for colonoscopy:       Diagnoses and all orders for this visit:    1. Encounter for gynecological examination without abnormal finding (Primary)    2. Screening for cervical cancer  -     IGP, Apt HPV,rfx 16 / 18,45    3. Screening for HPV (human papillomavirus)  -     IGP, Apt HPV,rfx 16 / " 18,45            Counseling:  --Nutrition: Stressed importance of moderation and caloric balance, stressed fresh fruit and vegetables  --Exercise: Stressed the importance of regular exercise. 3-5 times weekly   - Discussed screening mammogram recommendations.   --Discussed benefits of screening colonoscopy- age 45 unless FH  --Discussed pap smear screening recommendations

## 2023-05-17 ENCOUNTER — TRANSCRIBE ORDERS (OUTPATIENT)
Dept: ADMINISTRATIVE | Facility: HOSPITAL | Age: 53
End: 2023-05-17
Payer: COMMERCIAL

## 2023-05-17 DIAGNOSIS — G35 MULTIPLE SCLEROSIS: Primary | ICD-10-CM

## 2023-06-12 ENCOUNTER — HOSPITAL ENCOUNTER (OUTPATIENT)
Dept: MRI IMAGING | Facility: HOSPITAL | Age: 53
Discharge: HOME | End: 2023-06-12
Admitting: REGISTERED NURSE
Payer: COMMERCIAL

## 2023-06-12 DIAGNOSIS — G35 MULTIPLE SCLEROSIS: ICD-10-CM

## 2023-06-12 PROCEDURE — A9577 INJ MULTIHANCE: HCPCS | Performed by: REGISTERED NURSE

## 2023-06-12 PROCEDURE — 82565 ASSAY OF CREATININE: CPT

## 2023-06-12 PROCEDURE — 0 GADOBENATE DIMEGLUMINE 529 MG/ML SOLUTION: Performed by: REGISTERED NURSE

## 2023-06-12 PROCEDURE — 70553 MRI BRAIN STEM W/O & W/DYE: CPT

## 2023-06-12 RX ADMIN — GADOBENATE DIMEGLUMINE 15 ML: 529 INJECTION, SOLUTION INTRAVENOUS at 11:25

## 2023-06-13 LAB — CREAT BLDA-MCNC: 0.8 MG/DL (ref 0.6–1.3)

## 2024-05-13 ENCOUNTER — APPOINTMENT (OUTPATIENT)
Dept: WOMENS IMAGING | Facility: HOSPITAL | Age: 54
End: 2024-05-13
Payer: COMMERCIAL

## 2024-05-13 PROCEDURE — 77062 BREAST TOMOSYNTHESIS BI: CPT | Performed by: RADIOLOGY

## 2024-05-13 PROCEDURE — G0279 TOMOSYNTHESIS, MAMMO: HCPCS | Performed by: RADIOLOGY

## 2024-05-13 PROCEDURE — 77066 DX MAMMO INCL CAD BI: CPT | Performed by: RADIOLOGY

## 2024-06-04 ENCOUNTER — OFFICE VISIT (OUTPATIENT)
Dept: OBSTETRICS AND GYNECOLOGY | Age: 54
End: 2024-06-04
Payer: COMMERCIAL

## 2024-06-04 VITALS
DIASTOLIC BLOOD PRESSURE: 68 MMHG | SYSTOLIC BLOOD PRESSURE: 110 MMHG | HEIGHT: 65 IN | BODY MASS INDEX: 27.32 KG/M2 | WEIGHT: 164 LBS

## 2024-06-04 DIAGNOSIS — Z01.419 ENCOUNTER FOR GYNECOLOGICAL EXAMINATION WITHOUT ABNORMAL FINDING: Primary | ICD-10-CM

## 2024-06-04 PROCEDURE — 99396 PREV VISIT EST AGE 40-64: CPT | Performed by: OBSTETRICS & GYNECOLOGY

## 2024-06-04 RX ORDER — NALTREXONE HCL 1.5 MG
CAPSULE ORAL
COMMUNITY
Start: 2024-01-01

## 2024-06-04 NOTE — PROGRESS NOTES
Routine Annual Visit    2024    Patient: Tasneem Nicholas          MR#:[id number]      Chief Complaint   Patient presents with    Gynecologic Exam     Annual Exam - last pap 5/15/23 neg, mammo 24, cologuard 1/15/22 neg, pt has no complaints today       History of Present Illness    54 y.o. female  who presents for annual exam.     Patient is feeling well  She has a history of MS and is treating this with a holistic treatment plan  She gets about 2 MRIs per year  She usually has about 2 relapses a year  Right now she feels great  No vaginal bleeding  No hot flashes or night sweats  She does work full-time  She is up-to-date on her Pap  Mammogram is up-to-date  Cologuard is up-to-date      No LMP recorded. Patient is perimenopausal.  Obstetric History:  OB History          0    Para   0    Term   0       0    AB   0    Living   0         SAB   0    IAB   0    Ectopic   0    Molar   0    Multiple   0    Live Births   0               Menstrual History:     No LMP recorded. Patient is perimenopausal.       Sexual History:       ________________________________________  Patient Active Problem List   Diagnosis    UTI (urinary tract infection), uncomplicated       Past Medical History:   Diagnosis Date    Anxiety     Hepatitis B     Tested pos for hep-b antibodies - dont know when I had it    Hypertension     Kidney stone 2019    1kidnet stone and passed    MS (multiple sclerosis) 2021    PMS (premenstrual syndrome) Since 12    Urinary tract infection 2000    Varicella Child       Past Surgical History:   Procedure Laterality Date    WISDOM TOOTH EXTRACTION         Social History     Tobacco Use   Smoking Status Never   Smokeless Tobacco Never       has a current medication list which includes the following prescription(s): clonazepam, escitalopram, naltrex, probiotic product, and vitamin d3.  ________________________________________    Current contraception: post menopausal  "status  History of abnormal Pap smear: no  Family history of Breast cancer: no  Family history of uterine or ovarian cancer: no  Family History of colon cancer/colon polyps: no  History of abnormal mammogram: no      The following portions of the patient's history were reviewed and updated as appropriate: allergies, current medications, past family history, past medical history, past social history, past surgical history, and problem list.    Review of Systems    Pertinent items are noted in HPI.     Objective   Physical Exam    /68   Ht 163.8 cm (64.5\")   Wt 74.4 kg (164 lb)   BMI 27.72 kg/m²    BP Readings from Last 3 Encounters:   06/04/24 110/68   05/15/23 118/78   04/25/22 126/78      Wt Readings from Last 3 Encounters:   06/04/24 74.4 kg (164 lb)   05/15/23 73.1 kg (161 lb 3.2 oz)   04/25/22 71.7 kg (158 lb)      BMI: Estimated body mass index is 27.72 kg/m² as calculated from the following:    Height as of this encounter: 163.8 cm (64.5\").    Weight as of this encounter: 74.4 kg (164 lb).      General:   alert, appears stated age, and cooperative   Abdomen: soft, non-tender, without masses or organomegaly   Breast: inspection negative, no nipple discharge or bleeding, no masses or nodularity palpable   Vulva: normal, Bartholin's, Urethra, Impact's normal   Vagina: normal mucosa   Cervix: no cervical motion tenderness and no lesions   Uterus: normal size, mobile, and non-tender   Adnexa: no mass, fullness, tenderness     Assessment:    1. Normal annual exam   Assessment     ICD-10-CM ICD-9-CM   1. Encounter for gynecological examination without abnormal finding  Z01.419 V72.31     Plan:    Plan     []  Mammogram request made  []  PAP done  []  Labs:   []  GC/Chl/TV  []  DEXA scan   []  Referral for colonoscopy:       Diagnoses and all orders for this visit:    1. Encounter for gynecological examination without abnormal finding (Primary)            Counseling:  --Nutrition: Stressed importance of " moderation and caloric balance, stressed fresh fruit and vegetables  --Exercise: Stressed the importance of regular exercise. 3-5 times weekly   - Discussed screening mammogram recommendations.   --Discussed benefits of screening colonoscopy- age 45 unless FH  --Discussed pap smear screening recommendations

## 2024-08-12 ENCOUNTER — TELEPHONE (OUTPATIENT)
Dept: NEUROLOGY | Facility: CLINIC | Age: 54
End: 2024-08-12
Payer: COMMERCIAL

## 2024-08-12 NOTE — TELEPHONE ENCOUNTER
LVM informing them that we are r/s appt due to provider being out of office. Had sent a   mail reminder and Allen Tourst Message (if applicable) on new scheduled appointment date for  Sept 19th at 10:00AM

## 2024-09-19 ENCOUNTER — OFFICE VISIT (OUTPATIENT)
Dept: NEUROLOGY | Facility: CLINIC | Age: 54
End: 2024-09-19
Payer: COMMERCIAL

## 2024-09-19 VITALS
BODY MASS INDEX: 27.66 KG/M2 | DIASTOLIC BLOOD PRESSURE: 86 MMHG | WEIGHT: 162 LBS | OXYGEN SATURATION: 97 % | HEART RATE: 90 BPM | SYSTOLIC BLOOD PRESSURE: 134 MMHG | RESPIRATION RATE: 20 BRPM | HEIGHT: 64 IN

## 2024-09-19 DIAGNOSIS — G35 MULTIPLE SCLEROSIS: Primary | ICD-10-CM

## 2024-09-19 RX ORDER — NALTREXONE HCL 4.5 MG
3.5 CAPSULE ORAL DAILY
COMMUNITY
Start: 2024-01-01

## 2024-09-19 RX ORDER — ESCITALOPRAM OXALATE 10 MG
TABLET ORAL
COMMUNITY
Start: 2018-01-12

## 2024-09-20 ENCOUNTER — PATIENT ROUNDING (BHMG ONLY) (OUTPATIENT)
Dept: NEUROLOGY | Facility: CLINIC | Age: 54
End: 2024-09-20
Payer: COMMERCIAL

## 2024-10-22 ENCOUNTER — HOSPITAL ENCOUNTER (OUTPATIENT)
Dept: MRI IMAGING | Facility: HOSPITAL | Age: 54
Discharge: HOME | End: 2024-10-22
Admitting: PSYCHIATRY & NEUROLOGY
Payer: COMMERCIAL

## 2024-10-22 DIAGNOSIS — G35 MULTIPLE SCLEROSIS: ICD-10-CM

## 2024-10-22 PROCEDURE — A9577 INJ MULTIHANCE: HCPCS | Performed by: PSYCHIATRY & NEUROLOGY

## 2024-10-22 PROCEDURE — 70553 MRI BRAIN STEM W/O & W/DYE: CPT

## 2024-10-22 PROCEDURE — 0 GADOBENATE DIMEGLUMINE 529 MG/ML SOLUTION: Performed by: PSYCHIATRY & NEUROLOGY

## 2024-10-22 RX ADMIN — GADOBENATE DIMEGLUMINE 15 ML: 529 INJECTION, SOLUTION INTRAVENOUS at 14:20

## 2024-12-10 ENCOUNTER — TELEPHONE (OUTPATIENT)
Dept: NEUROLOGY | Facility: CLINIC | Age: 54
End: 2024-12-10
Payer: COMMERCIAL

## 2024-12-10 NOTE — TELEPHONE ENCOUNTER
Caller: Tasneem Nicholas    Relationship: Self    Best call back number: [phone]    What was the call regarding: PT STATES SHE FELL A LITTLE OVER A MONTH AGO. SHE STATES SHE FELL OUT OF THE BLUE WITH NO DIZZINESS, PRE-SYNCOPE, OR LIGHTHEADEDNESS BEFORE SHE FELL. PT DID NOT REPORT TO THE ED FOLLOWING THE FALL.    HER ORTHOPEDIST THOUGHT SHE HAD TORN HER ROTATOR CUFF. SHE HAD AN MRI R SHOULDER LAST WEEK. TORN ROTATOR CUFF WAS RULED OUT; PT DIAGNOSED WITH FROZEN SHOULDER DUE TO SIGNIFICANT INFLAMMATION. PT ASKS IF DR. COBURN HAS ANY THOUGHTS EGARDING THE UNPROMPTED FALL OR SHOULDER CONCERN.    Do you require a callback: YES, PLEASE.    PLEASE REVIEW AND ADVISE.

## 2025-01-13 ENCOUNTER — TELEPHONE (OUTPATIENT)
Dept: NEUROLOGY | Facility: CLINIC | Age: 55
End: 2025-01-13
Payer: COMMERCIAL

## 2025-01-13 NOTE — TELEPHONE ENCOUNTER
Pt request for form completion not completed.  I called pt.  She will come back in tomorrow, 1/15/2025, to sign form.

## 2025-01-13 NOTE — TELEPHONE ENCOUNTER
Spoke to pt in regards to r/s appt due to provider being out of office. Agreed to be r/s for later that week and is aware of updates on [employer].

## 2025-01-13 NOTE — TELEPHONE ENCOUNTER
PT of Dr. Stringer dropped off [facility] paperwork to be filled out by 1-17-25. Would like paperwork faxed. PT was informed of [facility] paperwork fee.      Thank you

## 2025-01-15 ENCOUNTER — HOSPITAL ENCOUNTER (OUTPATIENT)
Dept: MRI IMAGING | Facility: HOSPITAL | Age: 55
Discharge: HOME | End: 2025-01-15
Admitting: PSYCHIATRY & NEUROLOGY
Payer: COMMERCIAL

## 2025-01-15 DIAGNOSIS — G35 MULTIPLE SCLEROSIS: ICD-10-CM

## 2025-01-15 PROCEDURE — 25510000002 GADOBENATE DIMEGLUMINE 529 MG/ML SOLUTION: Performed by: PSYCHIATRY & NEUROLOGY

## 2025-01-15 PROCEDURE — 72156 MRI NECK SPINE W/O & W/DYE: CPT

## 2025-01-15 PROCEDURE — A9577 INJ MULTIHANCE: HCPCS | Performed by: PSYCHIATRY & NEUROLOGY

## 2025-01-15 RX ADMIN — GADOBENATE DIMEGLUMINE 14 ML: 529 INJECTION, SOLUTION INTRAVENOUS at 17:14

## 2025-01-23 ENCOUNTER — TRANSCRIBE ORDERS (OUTPATIENT)
Dept: ADMINISTRATIVE | Facility: HOSPITAL | Age: 55
End: 2025-01-23
Payer: COMMERCIAL

## 2025-01-23 ENCOUNTER — OFFICE VISIT (OUTPATIENT)
Dept: NEUROLOGY | Facility: CLINIC | Age: 55
End: 2025-01-23
Payer: COMMERCIAL

## 2025-01-23 VITALS
OXYGEN SATURATION: 97 % | HEART RATE: 85 BPM | SYSTOLIC BLOOD PRESSURE: 100 MMHG | DIASTOLIC BLOOD PRESSURE: 74 MMHG | HEIGHT: 64 IN | BODY MASS INDEX: 27.49 KG/M2 | RESPIRATION RATE: 20 BRPM | WEIGHT: 161 LBS

## 2025-01-23 DIAGNOSIS — Z13.6 SCREENING FOR HEART DISEASE: Primary | ICD-10-CM

## 2025-01-23 DIAGNOSIS — G35 MULTIPLE SCLEROSIS: Primary | ICD-10-CM

## 2025-01-23 PROBLEM — F41.9 ANXIETY: Status: ACTIVE | Noted: 2018-01-24

## 2025-01-23 PROBLEM — F32.A DEPRESSIVE DISORDER: Status: ACTIVE | Noted: 2020-06-19

## 2025-01-23 PROBLEM — J30.9 ALLERGIC RHINITIS: Status: ACTIVE | Noted: 2018-01-24

## 2025-01-23 PROBLEM — I10 HYPERTENSION: Status: ACTIVE | Noted: 2020-06-19

## 2025-01-23 NOTE — PROGRESS NOTES
"DOS: 2025  NAME: Tasneem Nicholas   : 1970  PCP: Angle Ch APRN    Chief Complaint   Patient presents with    Multiple Sclerosis      SUBJECTIVE  Neurological Problem:  54 y.o. right-handed female with a past medical history of hypertension, multiple sclerosis, anxiety/depression. They are seen in follow up today for Multiple Sclerosis, however the problem is new to the examiner. Patient last seen by Dr. Chase Stringer in 2024, with a summary of the history taken from the previous note with additions/modifications as indicated. She is unaccompanied.    Interval History:   Mrs. Nicholas follows with our clinic for multiple sclerosis, previously seen by Dr. Stringer.  Per thorough review of chart in 2021 she developed an abnormal right lower face sensation that she described as \"wind burn\".  A few days prior to this she experienced urinary incontinence x 2.  She was seen by her OB/GYN who did not expect a UTI.  She says a few years prior to these events she had an episode where she awoke 1 morning and could not see out of 1 eye, described as a shade went down over her left eye vision.  This resolved spontaneously after a few minutes.  She said she had a CT scan of her head and saw ophthalmology and was told \"everything was unremarkable\".  She then presented to [facility] 2021, sent to the ER from an immediate care center.  MRI brain showed multiple areas of T2/FLAIR hyperintensity in the periventricular white matter and a lesion in the right medulla and dao with some gadolinium enhancement.  MRI cervical spine completed as well which showed a T2 hyperintense lesion at C5 on the right.  She underwent CSF studies which were unremarkable, no unpared oligoclonal bands noted.  She had an initial workup for MS mimics which was also unremarkable.  She continued to follow with [facility] clinic for several years, received IV Solu-Medrol x 1 and decided to forego " "disease modifying therapy.  When she was seen by Dr. Stringer in September 2024 she reported several exacerbations over the years, her most recent 1 occurring in May 2024.  MRI brain completed at that time showed a new 4 mm left frontal subcortical enhancing lesion and a punctate focus of enhancement in the left parietal white matter.  Disease modifying therapy was discussed and she again wanted to hold off.  Repeat MRIs were ordered for November.    She presents today in follow-up, denies any symptoms of a flare.  She says she can tell when she is experiencing a flare because she will have extreme fatigue that will cause her to stay in bed until \"I am in remission\".  Says her only deficits have been brain fog and frequent urination.  She still vocalizes no interest in disease modifying therapy.  She said she tried Copaxone for approximately 6 months some years ago but did not like how it made her feel.  She changed her diet and make some other lifestyle changes which she found helpful.  She says she feels that she will have flares of her MS anytime she is significantly stressed, states when her mother passed away she had 1.  She says she feels that she has adequate coping skills to prevent her life stressors from being too overwhelming.  She vocalizes understanding that her disease process could result in permanent neurologic damage.  She tells me she did fall after tripping over a curb.  She thought she had a torn rotator cuff in her right arm however MRI showed a frozen shoulder.  She has been completing physical therapy for this and feels it has been helpful.  She did have MRI of her brain and cervical spinal cord completed which showed stability compared to imaging completed in May 2024 with no new active or enhancing demyelinating lesions.  She denies any focal symptoms, no visual or speech deficit, no facial droop or difficulty swallowing, no unilateral weakness or numbness to her extremities.    Review of " "Systems   HENT:  Negative for facial swelling, trouble swallowing and voice change.    Eyes:  Negative for pain and visual disturbance.   Genitourinary:  Positive for frequency.   Musculoskeletal: Negative.  Negative for arthralgias, gait problem and myalgias.   Neurological: Negative.    Psychiatric/Behavioral:  Positive for confusion (brain fog). The patient is nervous/anxious.         The following portions of the patient's history were reviewed and updated as appropriate: allergies, current medications, past family history, past medical history, past social history, past surgical history and problem list.    Current Medications:   Current Outpatient Medications:     clonazePAM (KlonoPIN) 0.5 MG tablet, Take 1 tablet by mouth 2 (Two) Times a Day As Needed for Seizures. (Patient taking differently: Take 1 tablet by mouth As Needed for Anxiety (when flying).), Disp: , Rfl:     Lexapro 10 MG tablet, , Disp: , Rfl:     Naltrexone HCl, Pain, (Naltrex) 4.5 MG capsule, 3.5 mg Daily. Patient takes 3.5mg, Disp: , Rfl:     Probiotic Product (PROBIOTIC DAILY PO), Take  by mouth., Disp: , Rfl:     vitamin D3 (vitamin d) 125 MCG (5000 UT) capsule capsule, Take 8,000 Units by mouth Daily., Disp: , Rfl:   **I did not stop or change the above medications.  Patient's medication list was updated to reflect medications they have reported as currently taking, including medication changes made by other providers.    Objective   Vital Signs:  /74   Pulse 85   Resp 20   Ht 163.8 cm (64.49\")   Wt 73 kg (161 lb)   SpO2 97%   BMI 27.22 kg/m²   Body mass index is 27.22 kg/m².    Physical Exam   Physical Exam:  GENERAL: NAD, alert  HEENT: Normocephalic, atraumatic   Resp: Even and unlabored  Neurological:   MS: AOx3, recent/remote memory intact, normal attention/concentration, language intact, no neglect  CN: visual acuity grossly normal, PERRL, EOMI, no nystagmus, no facial droop, no dysarthria, tongue midline, bilateral " "shoulder shrug symmetric  Motor: Normal tone and bulk. No tremor or abnormal movements noted. No asterixis.   Trapezius elevation: 5/5 LUE, 5/5 RUE  Shoulder abductors: 5/5 LUE, 5/5 RUE   Elbow flexors: 5/5 LUE, 5/5 RUE  Elbow extensors: 5/5 LUE, 5/5 RUE  Left  2+  Right  2+  Hip flexors 5/5 LLE, 5/5 RLE   Knee flexion: 5/5 LLE, 5/5 RLE   Hamstring strength: 5/5 LLE, 5/5 RLE  Dorsiflexors: 5/5 LLE, 5/5 RLE  Plantar flexors: 5/5 LLE, 5/5 RLE  Sensory: Intact to crude touch in all four ext.  Coordination: No dysmetria finger to nose   Gait and station: Normal gait and station    Reflexes   Right brachioradialis: 2+  Left brachioradialis: 2+  Right biceps: 2+  Left biceps: 2+  Right triceps: 2+  Left triceps: 2+  Right patellar: 2+  Left patellar: 2+  Right achilles: 2+  Left achilles: 2+   Amaro's negative. No clonus    Result Review :  The following data was reviewed by: EROS Lal on 01/23/2025:  Laboratory Results:         Lab Results   Component Value Date    WBC 6.63 03/07/2023    HGB 14.3 03/07/2023    HCT 43.2 03/07/2023    MCV 88.5 03/07/2023     03/07/2023     Lab Results   Component Value Date    BUN 12 03/07/2023    CREATININE 0.80 06/12/2023    EGFRIFAFRI >60 03/07/2023    BCR 21.4 03/07/2023    K 4.3 03/07/2023    CO2 25 03/07/2023    CALCIUM 10.0 03/07/2023    ALBUMIN 4.7 03/07/2023    LABIL2 1.7 03/07/2023    AST 21 03/07/2023    ALT 18 03/07/2023     Lab Results   Component Value Date    HGBA1C 5.7 (H) 11/30/2021     No results found for: \"CHOL\"  No results found for: \"HDL\"  No results found for: \"LDL\"  No results found for: \"TRIG\"  No results found for: \"RPR\"  No results found for: \"TSH\"  Lab Results   Component Value Date    [id number] 679 03/07/2023       Data reviewed : Radiologic studies   and Consultant notes             Assessment and Plan   Diagnoses and all orders for this visit:    1. Multiple sclerosis (Primary)  -     MRI Brain With & Without Contrast; Future  -  "    MRI Cervical Spine With & Without Contrast; Future  -     MRI Thoracic Spine With & Without Contrast; Future      Tasneem continues to desire remaining off disease modifying therapy for multiple sclerosis.  She feels that what she is doing has been working.  Most recent MRI and cervical spine imaging shows no new disease burden.  We will repeat MRI brain, cervical spine and thoracic spine in 1 year.  Discussed red flag symptoms and when to go to the ER.  Also told her with any flares we would like to be made aware.    We will see Tasneem back in 12 months, sooner if symptoms warrant.     EROS Lal  [facility]   01/23/25       I spent 40 minutes caring for Tasneem on this date of service. This time includes time spent by me in the following activities:preparing for the visit, reviewing tests, obtaining and/or reviewing a separately obtained history, performing a medically appropriate examination and/or evaluation , counseling and educating the patient/family/caregiver, ordering medications, tests, or procedures, referring and communicating with other health care professionals , documenting information in the medical record, independently interpreting results and communicating that information with the patient/family/caregiver, and care coordination  Follow Up   No follow-ups on file.  Patient was given instructions and counseling regarding her condition or for health maintenance advice. Please see specific information pulled into the AVS if appropriate.       Dictated using Dragon Dictation

## 2025-02-03 ENCOUNTER — TELEPHONE (OUTPATIENT)
Dept: NEUROLOGY | Facility: CLINIC | Age: 55
End: 2025-02-03

## 2025-02-03 NOTE — TELEPHONE ENCOUNTER
Caller: Tasneem Nicholas    Relationship: Self    Best call back number: [phone]    What form or medical record are you requesting: [place]    Who is requesting this form or medical record from you: [facility] [employer]    How would you like to receive the form or medical records (pick-up, mail, fax): FAX  If fax, what is the fax number: ON FORM      Timeframe paperwork needed: WAS DUE ON 1/18/25    Additional notes: PATIENT SAW THAT IS WAS COMPLETED WHEN SHE WAS THERE AND DOESN'T KNOW WHY WE HAVE NOT FAXED IT BACK YET.

## 2025-04-02 ENCOUNTER — HOSPITAL ENCOUNTER (OUTPATIENT)
Dept: CARDIOLOGY | Facility: HOSPITAL | Age: 55
Discharge: HOME | End: 2025-04-02

## 2025-04-02 ENCOUNTER — HOSPITAL ENCOUNTER (OUTPATIENT)
Dept: CT IMAGING | Facility: HOSPITAL | Age: 55
Discharge: HOME | End: 2025-04-02

## 2025-04-02 DIAGNOSIS — Z13.6 SCREENING FOR HEART DISEASE: ICD-10-CM

## 2025-04-02 PROCEDURE — 75571 CT HRT W/O DYE W/CA TEST: CPT

## 2025-04-02 PROCEDURE — VASCULARSCN2 VASCULAR SCREENING (BUNDLE) CAR: Performed by: INTERNAL MEDICINE

## 2025-04-02 PROCEDURE — 93799 UNLISTED CV SVC/PROCEDURE: CPT

## 2025-04-05 LAB
BH CV VAS SCREENING CAROTID CCA LEFT: 70 CM/SEC
BH CV VAS SCREENING CAROTID CCA RIGHT: 75 CM/SEC
BH CV VAS SCREENING CAROTID ICA LEFT: 58 CM/SEC
BH CV VAS SCREENING CAROTID ICA RIGHT: 57 CM/SEC
BH CV XLRA MEAS - MID AO DIAM: 1.6 CM
BH CV XLRA MEAS - PAD LEFT ABI PT: 1.14
BH CV XLRA MEAS - PAD LEFT ARM: 115 MMHG
BH CV XLRA MEAS - PAD LEFT LEG PT: 131 MMHG
BH CV XLRA MEAS - PAD RIGHT ABI PT: 1.1
BH CV XLRA MEAS - PAD RIGHT ARM: 110 MMHG
BH CV XLRA MEAS - PAD RIGHT LEG PT: 126 MMHG
BH CV XLRA MEAS LEFT DIST CCA EDV: 23 CM/SEC
BH CV XLRA MEAS LEFT DIST CCA PSV: 70.2 CM/SEC
BH CV XLRA MEAS LEFT ICA/CCA RATIO: 0.8
BH CV XLRA MEAS LEFT PROX ICA EDV: -21.7 CM/SEC
BH CV XLRA MEAS LEFT PROX ICA PSV: -58.4 CM/SEC
BH CV XLRA MEAS RIGHT DIST CCA EDV: 25.5 CM/SEC
BH CV XLRA MEAS RIGHT DIST CCA PSV: 75.2 CM/SEC
BH CV XLRA MEAS RIGHT ICA/CCA RATIO: 0.8
BH CV XLRA MEAS RIGHT PROX ICA EDV: -22.4 CM/SEC
BH CV XLRA MEAS RIGHT PROX ICA PSV: -56.5 CM/SEC

## 2025-05-08 ENCOUNTER — HOSPITAL ENCOUNTER (OUTPATIENT)
Dept: MRI IMAGING | Facility: HOSPITAL | Age: 55
Discharge: HOME | End: 2025-05-08
Payer: COMMERCIAL

## 2025-05-08 DIAGNOSIS — G35 MULTIPLE SCLEROSIS: ICD-10-CM

## 2025-05-08 PROCEDURE — 70553 MRI BRAIN STEM W/O & W/DYE: CPT

## 2025-05-08 PROCEDURE — 25510000002 GADOBENATE DIMEGLUMINE 529 MG/ML SOLUTION

## 2025-05-08 PROCEDURE — A9577 INJ MULTIHANCE: HCPCS

## 2025-05-08 RX ADMIN — GADOBENATE DIMEGLUMINE 15 ML: 529 INJECTION, SOLUTION INTRAVENOUS at 14:15

## 2025-05-20 ENCOUNTER — OFFICE VISIT (OUTPATIENT)
Dept: NEUROLOGY | Facility: CLINIC | Age: 55
End: 2025-05-20
Payer: COMMERCIAL

## 2025-05-20 VITALS
OXYGEN SATURATION: 98 % | DIASTOLIC BLOOD PRESSURE: 80 MMHG | WEIGHT: 162 LBS | HEART RATE: 71 BPM | BODY MASS INDEX: 27.39 KG/M2 | SYSTOLIC BLOOD PRESSURE: 110 MMHG

## 2025-05-20 DIAGNOSIS — F43.20 GRIEF REACTION: ICD-10-CM

## 2025-05-20 DIAGNOSIS — R41.89 BRAIN FOG: ICD-10-CM

## 2025-05-20 DIAGNOSIS — Z65.8 PSYCHOSOCIAL STRESSORS: ICD-10-CM

## 2025-05-20 DIAGNOSIS — G35 MULTIPLE SCLEROSIS: Primary | ICD-10-CM

## 2025-05-20 PROBLEM — H93.19 TINNITUS: Status: ACTIVE | Noted: 2018-01-28

## 2025-05-20 PROBLEM — H69.90 EUSTACHIAN TUBE DYSFUNCTION: Status: ACTIVE | Noted: 2018-03-22

## 2025-05-20 NOTE — PROGRESS NOTES
"DOS: 2025  NAME: Tasneem Nicholas   : 1970  PCP: Angle Ch APRN    Chief Complaint   Patient presents with    Multiple Sclerosis      SUBJECTIVE  Neurological Problem:  55 y.o. right-handed female with a past medical history of hypertension, multiple sclerosis, anxiety/depression. They are seen in follow up today for Multiple Sclerosis. A summary of the history was taken from the previous note with additions/modifications as indicated. She is unaccompanied.    Interval History:   **For detailed interval history see progress note dated 25.    Mrs. Nicholas follows with our clinic for multiple sclerosis, is a patient of Dr. Stringer's and more recently seen by me in 2025.  She began experiencing symptoms of MS in 2021 with sensation changes of the right lower face and urinary incontinence.  Sometime before that she experienced optic neuritis that resolved spontaneously after several minutes, was seen by an ophthalmologist and had a CT scan of her head and was told \"everything was unremarkable\".  In 2021 she had MRI brain and cervical spine imaging done that showed MS lesions. CSF studies which were unremarkable, no unpared oligoclonal bands noted.  She had an initial workup for MS mimics which was also unremarkable.  She decided to forego disease modifying therapy.  When she was seen by Dr. Stringer in 2024 she reported several exacerbations over the years, her most recent one occurring in May 2024.  MRI brain completed at that time showed a new 4 mm left frontal subcortical enhancing lesion and a punctate focus of enhancement in the left parietal white matter.  Disease modifying therapy was discussed and she again wanted to hold off; she previously tried Copaxone and did not like how it made her feel. She made diet and lifestyle changes which worked well for her.  Repeat MRIs were ordered for 2024 which were stable.     She presents today in follow-up.  MRI " "brain with and without contrast was performed on May 8th and showed a new area of enhancement along the right occipital horn nonspecific but likely representing an area of active demyelination along with multiple areas of chronic demyelination unchanged.  I called and spoke with her about these results and she reported \"feeling well\" with no weakness, vision change or eye pain, paresthesias or new incontinence; noted some cognitive change but said that is always a symptom for her. 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  She just returned from vacation in [place] and reported with exposure to the sun she was using a scooter to get around the [employer] [place], was also sleeping around 16 hours a day.  She said upon return home she felt back to her normal baseline.  She denied any fatigue leading up to this most recent MRI she said that is generally her tell.  She indicates lately that she has been experiencing more irrational thinking and \"paranoia\" especially related to her job and wonders if this is related to MS.    Review of Systems   HENT:  Negative for facial swelling, trouble swallowing and voice change.    Eyes:  Negative for pain and visual disturbance.   Genitourinary:  Positive for frequency.   Musculoskeletal: Negative.  Negative for arthralgias, gait problem and myalgias.   Neurological: Negative.    Psychiatric/Behavioral:  Positive for confusion (brain fog) and decreased concentration. The patient is nervous/anxious.         The following portions of the patient's history were reviewed and updated as appropriate: allergies, current medications, past family history, past medical history, past social history, past surgical history and problem list.    Current Medications:   Current " Outpatient Medications:     clonazePAM (KlonoPIN) 0.5 MG tablet, Take 1 tablet by mouth 2 (Two) Times a Day As Needed for Seizures. (Patient taking differently: Take 1 tablet by mouth As Needed for Anxiety (when flying).), Disp: , Rfl:     Lexapro 10 MG tablet, , Disp: , Rfl:     Naltrexone HCl, Pain, (Naltrex) 4.5 MG capsule, 3.5 mg Daily. Patient takes 3.5mg (Patient taking differently: Take 3 mg by mouth Daily. Patient takes 3mg), Disp: , Rfl:     Probiotic Product (PROBIOTIC DAILY PO), Take  by mouth., Disp: , Rfl:     vitamin D3 (vitamin d) 125 MCG (5000 UT) capsule capsule, Take 8,000 Units by mouth Daily., Disp: , Rfl:   **I did not stop or change the above medications.  Patient's medication list was updated to reflect medications they have reported as currently taking, including medication changes made by other providers.    Objective   Vital Signs:  /80   Pulse 71   Wt 73.5 kg (162 lb)   SpO2 98%   BMI 27.39 kg/m²   Body mass index is 27.39 kg/m².    Physical Exam   Physical Exam:  GENERAL: NAD, alert  HEENT: Normocephalic, atraumatic   Resp: Even and unlabored    Neurological:   MS: AOx3, recent/remote memory intact, normal attention/concentration, language intact, no neglect  CN: visual acuity grossly normal, PERRL, EOMI, no nystagmus, no facial droop, no dysarthria, tongue midline, bilateral shoulder shrug symmetric  Motor: Normal tone and bulk. No tremor or abnormal movements noted. No asterixis.   Trapezius elevation: 5/5 LUE, 5/5 RUE  Shoulder abductors: 5/5 LUE, 5/5 RUE   Elbow flexors: 5/5 LUE, 5/5 RUE  Elbow extensors: 5/5 LUE, 5/5 RUE  Left  2+  Right  2+  Hip flexors 5/5 LLE, 5/5 RLE   Knee flexion: 5/5 LLE, 5/5 RLE   Hamstring strength: 5/5 LLE, 5/5 RLE  Dorsiflexors: 5/5 LLE, 5/5 RLE  Plantar flexors: 5/5 LLE, 5/5 RLE  Sensory: Intact to crude touch in all four ext.  Gait and station: Normal gait and station    Reflexes   Right brachioradialis: 2+  Left brachioradialis:  "2+  Right biceps: 2+  Left biceps: 2+  Right triceps: 2+  Left triceps: 2+  Right patellar: 2+  Left patellar: 2+  Right achilles: 2+  Left achilles: 2+   No clonus    Result Review :  The following data was reviewed by: EROS Lal on 01/23/2025:  Laboratory Results:         Lab Results   Component Value Date    WBC 6.63 03/07/2023    HGB 14.3 03/07/2023    HCT 43.2 03/07/2023    MCV 88.5 03/07/2023     03/07/2023     Lab Results   Component Value Date    BUN 12 03/07/2023    CREATININE 0.80 06/12/2023    EGFRIFAFRI >60 03/07/2023    BCR 21.4 03/07/2023    K 4.3 03/07/2023    CO2 25 03/07/2023    CALCIUM 10.0 03/07/2023    ALBUMIN 4.7 03/07/2023    LABIL2 1.7 03/07/2023    AST 21 03/07/2023    ALT 18 03/07/2023     Lab Results   Component Value Date    HGBA1C 5.7 (H) 11/30/2021     No results found for: \"CHOL\"  No results found for: \"HDL\"  No results found for: \"LDL\"  No results found for: \"TRIG\"  No results found for: \"RPR\"  No results found for: \"TSH\"  Lab Results   Component Value Date    [id number] 03/07/2023       Data reviewed : Radiologic studies   and Consultant notes             Assessment and Plan   Diagnoses and all orders for this visit:    1. Multiple sclerosis (Primary)  -     MRI Brain With & Without Contrast; Future  -     MRI Cervical Spine With & Without Contrast; Future  -     MRI Thoracic Spine With & Without Contrast; Future    2. Brain fog    3. Psychosocial stressors    4. Grief reaction      We discussed Vumerity today, patient information given.  She would like to think on this and let us know if she would like to initiate on this therapy.  She acknowledges disease progression without treatment and understands the risks.  We will repeat MRI brain, cervical and thoracic spine in the next 3 months.  Encouraged her to seek treatment for current significant stressors and grief.    We will see Tasneem back in 12 months, sooner if symptoms warrant.     Nola Sexton, " EROS  [facility]   05/20/25       I spent 40 minutes caring for Tasneem on this date of service. This time includes time spent by me in the following activities:preparing for the visit, reviewing tests, obtaining and/or reviewing a separately obtained history, performing a medically appropriate examination and/or evaluation , counseling and educating the patient/family/caregiver, ordering medications, tests, or procedures, referring and communicating with other health care professionals , documenting information in the medical record, independently interpreting results and communicating that information with the patient/family/caregiver, and care coordination  Follow Up   No follow-ups on file.  Patient was given instructions and counseling regarding her condition or for health maintenance advice. Please see specific information pulled into the AVS if appropriate.       Dictated using Dragon Dictation

## 2025-07-22 ENCOUNTER — TELEPHONE (OUTPATIENT)
Dept: NEUROLOGY | Facility: CLINIC | Age: 55
End: 2025-07-22
Payer: COMMERCIAL

## 2025-08-25 ENCOUNTER — HOSPITAL ENCOUNTER (OUTPATIENT)
Dept: MRI IMAGING | Facility: HOSPITAL | Age: 55
Discharge: HOME | End: 2025-08-25
Payer: COMMERCIAL

## 2025-08-25 DIAGNOSIS — G35 MULTIPLE SCLEROSIS: ICD-10-CM

## 2025-08-25 PROCEDURE — 70553 MRI BRAIN STEM W/O & W/DYE: CPT

## 2025-08-25 PROCEDURE — 25510000002 GADOBENATE DIMEGLUMINE 529 MG/ML SOLUTION

## 2025-08-25 PROCEDURE — A9577 INJ MULTIHANCE: HCPCS

## 2025-08-25 RX ADMIN — GADOBENATE DIMEGLUMINE 15 ML: 529 INJECTION, SOLUTION INTRAVENOUS at 15:58

## 2025-08-27 ENCOUNTER — HOSPITAL ENCOUNTER (OUTPATIENT)
Dept: MRI IMAGING | Facility: HOSPITAL | Age: 55
Discharge: HOME | End: 2025-08-27
Payer: COMMERCIAL

## 2025-08-27 ENCOUNTER — PATIENT MESSAGE (OUTPATIENT)
Dept: NEUROLOGY | Facility: CLINIC | Age: 55
End: 2025-08-27
Payer: COMMERCIAL

## 2025-08-27 DIAGNOSIS — G35 MULTIPLE SCLEROSIS: ICD-10-CM

## 2025-08-27 PROCEDURE — 82565 ASSAY OF CREATININE: CPT

## 2025-08-27 PROCEDURE — A9577 INJ MULTIHANCE: HCPCS

## 2025-08-27 PROCEDURE — 25510000002 GADOBENATE DIMEGLUMINE 529 MG/ML SOLUTION

## 2025-08-27 PROCEDURE — 72157 MRI CHEST SPINE W/O & W/DYE: CPT

## 2025-08-27 PROCEDURE — 72156 MRI NECK SPINE W/O & W/DYE: CPT

## 2025-08-27 RX ADMIN — GADOBENATE DIMEGLUMINE 15 ML: 529 INJECTION, SOLUTION INTRAVENOUS at 17:17

## 2025-08-29 LAB — CREAT BLDA-MCNC: 0.8 MG/DL (ref 0.6–1.3)
